# Patient Record
Sex: FEMALE | Race: WHITE | NOT HISPANIC OR LATINO | URBAN - METROPOLITAN AREA
[De-identification: names, ages, dates, MRNs, and addresses within clinical notes are randomized per-mention and may not be internally consistent; named-entity substitution may affect disease eponyms.]

---

## 2023-10-16 ENCOUNTER — INPATIENT (INPATIENT)
Facility: HOSPITAL | Age: 78
LOS: 2 days | Discharge: HOME | DRG: 919 | End: 2023-10-19
Attending: INTERNAL MEDICINE | Admitting: INTERNAL MEDICINE
Payer: MEDICARE

## 2023-10-16 VITALS
DIASTOLIC BLOOD PRESSURE: 68 MMHG | HEART RATE: 135 BPM | RESPIRATION RATE: 20 BRPM | SYSTOLIC BLOOD PRESSURE: 127 MMHG | OXYGEN SATURATION: 99 % | TEMPERATURE: 96 F

## 2023-10-16 DIAGNOSIS — K92.0 HEMATEMESIS: ICD-10-CM

## 2023-10-16 DIAGNOSIS — F32.A DEPRESSION, UNSPECIFIED: ICD-10-CM

## 2023-10-16 DIAGNOSIS — K91.840 POSTPROCEDURAL HEMORRHAGE OF A DIGESTIVE SYSTEM ORGAN OR STRUCTURE FOLLOWING A DIGESTIVE SYSTEM PROCEDURE: ICD-10-CM

## 2023-10-16 DIAGNOSIS — Z63.4 DISAPPEARANCE AND DEATH OF FAMILY MEMBER: ICD-10-CM

## 2023-10-16 DIAGNOSIS — K29.01 ACUTE GASTRITIS WITH BLEEDING: ICD-10-CM

## 2023-10-16 DIAGNOSIS — F03.A3 UNSPECIFIED DEMENTIA, MILD, WITH MOOD DISTURBANCE: ICD-10-CM

## 2023-10-16 DIAGNOSIS — D62 ACUTE POSTHEMORRHAGIC ANEMIA: ICD-10-CM
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
## 2023-10-16 LAB
ALBUMIN SERPL ELPH-MCNC: 3.4 G/DL — LOW (ref 3.5–5.2)
ALBUMIN SERPL ELPH-MCNC: 3.4 G/DL — LOW (ref 3.5–5.2)
ALBUMIN SERPL ELPH-MCNC: 4.1 G/DL — SIGNIFICANT CHANGE UP (ref 3.5–5.2)
ALP SERPL-CCNC: 48 U/L — SIGNIFICANT CHANGE UP (ref 30–115)
ALP SERPL-CCNC: 48 U/L — SIGNIFICANT CHANGE UP (ref 30–115)
ALP SERPL-CCNC: 57 U/L — SIGNIFICANT CHANGE UP (ref 30–115)
ALT FLD-CCNC: 6 U/L — SIGNIFICANT CHANGE UP (ref 0–41)
ALT FLD-CCNC: 6 U/L — SIGNIFICANT CHANGE UP (ref 0–41)
ALT FLD-CCNC: 8 U/L — SIGNIFICANT CHANGE UP (ref 0–41)
ANION GAP SERPL CALC-SCNC: 11 MMOL/L — SIGNIFICANT CHANGE UP (ref 7–14)
APTT BLD: 25.3 SEC — LOW (ref 27–39.2)
APTT BLD: 25.3 SEC — LOW (ref 27–39.2)
AST SERPL-CCNC: 16 U/L — SIGNIFICANT CHANGE UP (ref 0–41)
AST SERPL-CCNC: 16 U/L — SIGNIFICANT CHANGE UP (ref 0–41)
AST SERPL-CCNC: 20 U/L — SIGNIFICANT CHANGE UP (ref 0–41)
BASOPHILS # BLD AUTO: 0.06 K/UL — SIGNIFICANT CHANGE UP (ref 0–0.2)
BASOPHILS NFR BLD AUTO: 0.4 % — SIGNIFICANT CHANGE UP (ref 0–1)
BILIRUB SERPL-MCNC: 0.4 MG/DL — SIGNIFICANT CHANGE UP (ref 0.2–1.2)
BLD GP AB SCN SERPL QL: SIGNIFICANT CHANGE UP
BLD GP AB SCN SERPL QL: SIGNIFICANT CHANGE UP
BUN SERPL-MCNC: 33 MG/DL — HIGH (ref 10–20)
BUN SERPL-MCNC: 33 MG/DL — HIGH (ref 10–20)
BUN SERPL-MCNC: 36 MG/DL — HIGH (ref 10–20)
CALCIUM SERPL-MCNC: 8.7 MG/DL — SIGNIFICANT CHANGE UP (ref 8.4–10.5)
CALCIUM SERPL-MCNC: 8.7 MG/DL — SIGNIFICANT CHANGE UP (ref 8.4–10.5)
CALCIUM SERPL-MCNC: 9.4 MG/DL — SIGNIFICANT CHANGE UP (ref 8.4–10.5)
CHLORIDE SERPL-SCNC: 105 MMOL/L — SIGNIFICANT CHANGE UP (ref 98–110)
CO2 SERPL-SCNC: 26 MMOL/L — SIGNIFICANT CHANGE UP (ref 17–32)
CO2 SERPL-SCNC: 26 MMOL/L — SIGNIFICANT CHANGE UP (ref 17–32)
CO2 SERPL-SCNC: 28 MMOL/L — SIGNIFICANT CHANGE UP (ref 17–32)
CREAT SERPL-MCNC: 0.8 MG/DL — SIGNIFICANT CHANGE UP (ref 0.7–1.5)
CREAT SERPL-MCNC: 0.8 MG/DL — SIGNIFICANT CHANGE UP (ref 0.7–1.5)
CREAT SERPL-MCNC: 0.9 MG/DL — SIGNIFICANT CHANGE UP (ref 0.7–1.5)
EGFR: 66 ML/MIN/1.73M2 — SIGNIFICANT CHANGE UP
EGFR: 76 ML/MIN/1.73M2 — SIGNIFICANT CHANGE UP
EGFR: 76 ML/MIN/1.73M2 — SIGNIFICANT CHANGE UP
EOSINOPHIL # BLD AUTO: 0.01 K/UL — SIGNIFICANT CHANGE UP (ref 0–0.7)
EOSINOPHIL NFR BLD AUTO: 0.1 % — SIGNIFICANT CHANGE UP (ref 0–8)
GLUCOSE SERPL-MCNC: 85 MG/DL — SIGNIFICANT CHANGE UP (ref 70–99)
GLUCOSE SERPL-MCNC: 85 MG/DL — SIGNIFICANT CHANGE UP (ref 70–99)
GLUCOSE SERPL-MCNC: 97 MG/DL — SIGNIFICANT CHANGE UP (ref 70–99)
HCT VFR BLD CALC: 25 % — LOW (ref 37–47)
HCT VFR BLD CALC: 25 % — LOW (ref 37–47)
HCT VFR BLD CALC: 29 % — LOW (ref 37–47)
HGB BLD-MCNC: 8.1 G/DL — LOW (ref 12–16)
HGB BLD-MCNC: 8.1 G/DL — LOW (ref 12–16)
HGB BLD-MCNC: 9.3 G/DL — LOW (ref 12–16)
IMM GRANULOCYTES NFR BLD AUTO: 0.4 % — HIGH (ref 0.1–0.3)
INR BLD: 1.05 RATIO — SIGNIFICANT CHANGE UP (ref 0.65–1.3)
LACTATE SERPL-SCNC: 2.5 MMOL/L — HIGH (ref 0.7–2)
LYMPHOCYTES # BLD AUTO: 1.91 K/UL — SIGNIFICANT CHANGE UP (ref 1.2–3.4)
LYMPHOCYTES # BLD AUTO: 11.8 % — LOW (ref 20.5–51.1)
MCHC RBC-ENTMCNC: 27 PG — SIGNIFICANT CHANGE UP (ref 27–31)
MCHC RBC-ENTMCNC: 27.2 PG — SIGNIFICANT CHANGE UP (ref 27–31)
MCHC RBC-ENTMCNC: 27.2 PG — SIGNIFICANT CHANGE UP (ref 27–31)
MCHC RBC-ENTMCNC: 32.1 G/DL — SIGNIFICANT CHANGE UP (ref 32–37)
MCHC RBC-ENTMCNC: 32.4 G/DL — SIGNIFICANT CHANGE UP (ref 32–37)
MCHC RBC-ENTMCNC: 32.4 G/DL — SIGNIFICANT CHANGE UP (ref 32–37)
MCV RBC AUTO: 83.9 FL — SIGNIFICANT CHANGE UP (ref 81–99)
MCV RBC AUTO: 83.9 FL — SIGNIFICANT CHANGE UP (ref 81–99)
MCV RBC AUTO: 84.3 FL — SIGNIFICANT CHANGE UP (ref 81–99)
MONOCYTES # BLD AUTO: 0.52 K/UL — SIGNIFICANT CHANGE UP (ref 0.1–0.6)
MONOCYTES NFR BLD AUTO: 3.2 % — SIGNIFICANT CHANGE UP (ref 1.7–9.3)
NEUTROPHILS # BLD AUTO: 13.57 K/UL — HIGH (ref 1.4–6.5)
NEUTROPHILS NFR BLD AUTO: 84.1 % — HIGH (ref 42.2–75.2)
NRBC # BLD: 0 /100 WBCS — SIGNIFICANT CHANGE UP (ref 0–0)
PLATELET # BLD AUTO: 254 K/UL — SIGNIFICANT CHANGE UP (ref 130–400)
PLATELET # BLD AUTO: 254 K/UL — SIGNIFICANT CHANGE UP (ref 130–400)
PLATELET # BLD AUTO: 299 K/UL — SIGNIFICANT CHANGE UP (ref 130–400)
PMV BLD: 11.6 FL — HIGH (ref 7.4–10.4)
PMV BLD: 11.7 FL — HIGH (ref 7.4–10.4)
PMV BLD: 11.7 FL — HIGH (ref 7.4–10.4)
POTASSIUM SERPL-MCNC: 4 MMOL/L — SIGNIFICANT CHANGE UP (ref 3.5–5)
POTASSIUM SERPL-MCNC: 4 MMOL/L — SIGNIFICANT CHANGE UP (ref 3.5–5)
POTASSIUM SERPL-MCNC: 4.7 MMOL/L — SIGNIFICANT CHANGE UP (ref 3.5–5)
POTASSIUM SERPL-SCNC: 4 MMOL/L — SIGNIFICANT CHANGE UP (ref 3.5–5)
POTASSIUM SERPL-SCNC: 4 MMOL/L — SIGNIFICANT CHANGE UP (ref 3.5–5)
POTASSIUM SERPL-SCNC: 4.7 MMOL/L — SIGNIFICANT CHANGE UP (ref 3.5–5)
PROT SERPL-MCNC: 5.4 G/DL — LOW (ref 6–8)
PROT SERPL-MCNC: 5.4 G/DL — LOW (ref 6–8)
PROT SERPL-MCNC: 6.6 G/DL — SIGNIFICANT CHANGE UP (ref 6–8)
PROTHROM AB SERPL-ACNC: 12 SEC — SIGNIFICANT CHANGE UP (ref 9.95–12.87)
RBC # BLD: 2.98 M/UL — LOW (ref 4.2–5.4)
RBC # BLD: 2.98 M/UL — LOW (ref 4.2–5.4)
RBC # BLD: 3.44 M/UL — LOW (ref 4.2–5.4)
RBC # FLD: 13.8 % — SIGNIFICANT CHANGE UP (ref 11.5–14.5)
RBC # FLD: 13.9 % — SIGNIFICANT CHANGE UP (ref 11.5–14.5)
RBC # FLD: 13.9 % — SIGNIFICANT CHANGE UP (ref 11.5–14.5)
SODIUM SERPL-SCNC: 142 MMOL/L — SIGNIFICANT CHANGE UP (ref 135–146)
SODIUM SERPL-SCNC: 142 MMOL/L — SIGNIFICANT CHANGE UP (ref 135–146)
SODIUM SERPL-SCNC: 144 MMOL/L — SIGNIFICANT CHANGE UP (ref 135–146)
WBC # BLD: 12.48 K/UL — HIGH (ref 4.8–10.8)
WBC # BLD: 12.48 K/UL — HIGH (ref 4.8–10.8)
WBC # BLD: 16.14 K/UL — HIGH (ref 4.8–10.8)
WBC # FLD AUTO: 12.48 K/UL — HIGH (ref 4.8–10.8)
WBC # FLD AUTO: 12.48 K/UL — HIGH (ref 4.8–10.8)
WBC # FLD AUTO: 16.14 K/UL — HIGH (ref 4.8–10.8)

## 2023-10-16 PROCEDURE — 88305 TISSUE EXAM BY PATHOLOGIST: CPT

## 2023-10-16 PROCEDURE — 36415 COLL VENOUS BLD VENIPUNCTURE: CPT

## 2023-10-16 PROCEDURE — 86803 HEPATITIS C AB TEST: CPT

## 2023-10-16 PROCEDURE — 74177 CT ABD & PELVIS W/CONTRAST: CPT | Mod: 26,MA

## 2023-10-16 PROCEDURE — 80053 COMPREHEN METABOLIC PANEL: CPT

## 2023-10-16 PROCEDURE — 85027 COMPLETE CBC AUTOMATED: CPT

## 2023-10-16 PROCEDURE — 99285 EMERGENCY DEPT VISIT HI MDM: CPT | Mod: GC

## 2023-10-16 PROCEDURE — 99221 1ST HOSP IP/OBS SF/LOW 40: CPT

## 2023-10-16 PROCEDURE — 71045 X-RAY EXAM CHEST 1 VIEW: CPT | Mod: 26

## 2023-10-16 PROCEDURE — 80048 BASIC METABOLIC PNL TOTAL CA: CPT

## 2023-10-16 PROCEDURE — 88312 SPECIAL STAINS GROUP 1: CPT

## 2023-10-16 RX ORDER — LANOLIN ALCOHOL/MO/W.PET/CERES
3 CREAM (GRAM) TOPICAL AT BEDTIME
Refills: 0 | Status: DISCONTINUED | OUTPATIENT
Start: 2023-10-16 | End: 2023-10-18

## 2023-10-16 RX ORDER — ONDANSETRON 8 MG/1
4 TABLET, FILM COATED ORAL EVERY 8 HOURS
Refills: 0 | Status: DISCONTINUED | OUTPATIENT
Start: 2023-10-16 | End: 2023-10-18

## 2023-10-16 RX ORDER — PANTOPRAZOLE SODIUM 20 MG/1
8 TABLET, DELAYED RELEASE ORAL
Qty: 80 | Refills: 0 | Status: DISCONTINUED | OUTPATIENT
Start: 2023-10-16 | End: 2023-10-17

## 2023-10-16 RX ORDER — PANTOPRAZOLE SODIUM 20 MG/1
80 TABLET, DELAYED RELEASE ORAL ONCE
Refills: 0 | Status: COMPLETED | OUTPATIENT
Start: 2023-10-16 | End: 2023-10-16

## 2023-10-16 RX ORDER — CEFTRIAXONE 500 MG/1
1000 INJECTION, POWDER, FOR SOLUTION INTRAMUSCULAR; INTRAVENOUS ONCE
Refills: 0 | Status: COMPLETED | OUTPATIENT
Start: 2023-10-16 | End: 2023-10-16

## 2023-10-16 RX ORDER — CEFTRIAXONE 500 MG/1
1000 INJECTION, POWDER, FOR SOLUTION INTRAMUSCULAR; INTRAVENOUS EVERY 24 HOURS
Refills: 0 | Status: DISCONTINUED | OUTPATIENT
Start: 2023-10-16 | End: 2023-10-17

## 2023-10-16 RX ORDER — SODIUM CHLORIDE 9 MG/ML
1000 INJECTION, SOLUTION INTRAVENOUS ONCE
Refills: 0 | Status: COMPLETED | OUTPATIENT
Start: 2023-10-16 | End: 2023-10-16

## 2023-10-16 RX ORDER — ACETAMINOPHEN 500 MG
650 TABLET ORAL EVERY 6 HOURS
Refills: 0 | Status: DISCONTINUED | OUTPATIENT
Start: 2023-10-16 | End: 2023-10-18

## 2023-10-16 RX ORDER — CHLORHEXIDINE GLUCONATE 213 G/1000ML
1 SOLUTION TOPICAL
Refills: 0 | Status: DISCONTINUED | OUTPATIENT
Start: 2023-10-16 | End: 2023-10-18

## 2023-10-16 RX ADMIN — Medication 100 MILLIGRAM(S): at 20:15

## 2023-10-16 RX ADMIN — CEFTRIAXONE 100 MILLIGRAM(S): 500 INJECTION, POWDER, FOR SOLUTION INTRAMUSCULAR; INTRAVENOUS at 19:27

## 2023-10-16 RX ADMIN — PANTOPRAZOLE SODIUM 80 MILLIGRAM(S): 20 TABLET, DELAYED RELEASE ORAL at 15:30

## 2023-10-16 RX ADMIN — SODIUM CHLORIDE 1000 MILLILITER(S): 9 INJECTION, SOLUTION INTRAVENOUS at 16:11

## 2023-10-16 RX ADMIN — PANTOPRAZOLE SODIUM 10 MG/HR: 20 TABLET, DELAYED RELEASE ORAL at 15:30

## 2023-10-16 SDOH — SOCIAL STABILITY - SOCIAL INSECURITY: DISSAPEARANCE AND DEATH OF FAMILY MEMBER: Z63.4

## 2023-10-16 NOTE — H&P ADULT - HISTORY OF PRESENT ILLNESS
78-year-old female accompanied by her best friend at bed side c/o vomiting blood. Pt  with past medical history of diabetes, mild dementia.  Presenting with hematemesis.  Patient poor historian, states that she had tooth bridge implanted 10 days ago with bleeding afterwards which stopped 4 days ago.  Pt denies hematemesis.  Patient's best friend at bedside, endorsed witnessing hematemesis yesterday, multiple episodes.  Denies abdominal pain.  Denies fever, chills.  Denies lightheadedness NO  bright red blood per rectum or melena.  No history of alcohol use.   78-year-old female accompanied by her best friend at bed side c/o vomiting blood. Pt states that her friend didn't understand that she went to the dentist and she had an oral blister that bled and it's not GI bleed.  Pt  with past medical history of diabetes, mild dementia.  Presenting with hematemesis.  Patient poor historian, states that she had tooth bridge implanted 10 days ago with bleeding afterwards which stopped 4 days ago.  Pt denies hematemesis.  Patient's best friend at bedside, endorsed witnessing hematemesis yesterday, multiple episodes.  Denies abdominal pain.  Denies fever, chills.  Denies lightheadedness NO  bright red blood per rectum or melena.  No history of alcohol use.

## 2023-10-16 NOTE — ED PROVIDER NOTE - OBJECTIVE STATEMENT
Patient is a 78-year-old female with past medical history of diabetes, dementia?  Presenting with hematemesis.  Patient poor historian, states that she had tooth bridge implanted 10 days ago with bleeding afterwards which stopped 4 days ago.  Denies hematemesis.  Patient's best friend at bedside, endorsed witnessing hematemesis yesterday, multiple episodes. Denies bright red blood per rectum or melena.  Denies history of alcohol use.  Denies abdominal pain.  Denies fever, chills.  Denies lightheadedness.  Patient otherwise well

## 2023-10-16 NOTE — ED PROVIDER NOTE - CLINICAL SUMMARY MEDICAL DECISION MAKING FREE TEXT BOX
78-year-old female no past medical history states presenting here complaining of bleeding from mouth that resolved 4 days ago.  Patient states she had a bridge placed to her mouth 2 weeks ago and then blood from there endorsing she has not had vomiting blood although her sister had recommended she come to the ED patient denies fevers chills cough congestion nausea vomiting abdominal pain black bloody stools states does not take any medications because she is healthy has not needed to see a primary care doctor.  Later spoke with sister and friend who did endorse that she did have bloody emesis vs reviewed labs imaging ekg obtained and reviewed cxr demonstrated retrocardiac opacity, blood cultures and antibiotics given. GI consulted for hematemesis, ICU consulted recommended floor. s/o to hospitalist dr. shea

## 2023-10-16 NOTE — H&P ADULT - NSHPPHYSICALEXAM_GEN_ALL_CORE
Addended by: Taisha Cyr on: 5/7/2019 01:50 PM     Modules accepted: Orders GENERAL:  77y/o Female NAD, resting comfortably.  HEAD:  Atraumatic, Normocephalic  EYES: EOMI, PERRLA, conjunctiva and sclera clear  NECK: Supple, No JVD, no cervical lymphadenopathy, non-tender  CHEST/LUNG: Clear to auscultation bilaterally; No wheeze, rhonchi, or rales  HEART: Regular rate and rhythm; S1&S2  ABDOMEN: Soft, Nontender, Nondistended x 4 quadrants; Bowel sounds present  EXTREMITIES:   Peripheral Pulses Present, No clubbing, no cyanosis, or no edema, no calf tenderness  PSYCH: AAOx3, cooperative, appropriate  NEUROLOGY: WNL  SKIN: WNL

## 2023-10-16 NOTE — ED PROVIDER NOTE - ATTENDING CONTRIBUTION TO CARE
78-year-old female no past medical history states presenting here complaining of bleeding from mouth that resolved 4 days ago.  Patient states she had a bridge placed to her mouth 2 weeks ago and then blood from there endorsing she has not had vomiting blood although her sister had recommended she come to the ED patient denies fevers chills cough congestion nausea vomiting abdominal pain black bloody stools states does not take any medications because she is healthy has not needed to see a primary care doctor.  Later spoke with sister and friend who did endorse that she did have bloody emesis  CONSTITUTIONAL: WA / WN / NAD  HEAD: NCAT  EYES: PERRL; EOMI; pale conjunctiva   ENT: Normal pharynx; mucous membranes pink/moist, no erythema.  NECK: Supple; no meningeal signs  CARD: tachycardic nl S1/S2; no M/R/G. Pulses equal bilaterally.  RESP: Respiratory rate and effort are normal; breath sounds clear and equal bilaterally.  ABD: Soft, NT ND   MSK/EXT: No gross deformities; full range of motion.  SKIN: Warm and dry;   NEURO: AAOx3,  PSYCH: Memory Intact, Normal Affect

## 2023-10-16 NOTE — H&P ADULT - ASSESSMENT
78-year-old female accompanied by her best friend at bed side c/o vomiting blood. Pt states that her friend didn't understand that she went to the dentist and she had an oral blister that bled and it's not GI bleed.  Pt  with past medical history of diabetes, mild dementia.  Presenting with hematemesis.  Patient poor historian, states that she had tooth bridge implanted 10 days ago with bleeding afterwards which stopped 4 days ago.  Pt denies hematemesis.  Patient's best friend at bedside, endorsed witnessing hematemesis yesterday, multiple episodes.  Denies abdominal pain.  Denies fever, chills.  Denies lightheadedness NO  bright red blood per rectum or melena.  No history of alcohol use.      DX: GI bleed:  GI consult  10 pm CBC   am labs CBC , BMP    pt denies using medication     except Aleeve prn pain     prophylaxis GI/VTE    Case D/W Karan   78-year-old female accompanied by her best friend at bed side c/o vomiting blood. Pt states that her friend didn't understand that she went to the dentist and she had an oral blister that bled and it's not GI bleed.  Pt  with past medical history of diabetes, mild dementia.  Presenting with hematemesis.  Patient poor historian, states that she had tooth bridge implanted 10 days ago with bleeding afterwards which stopped 4 days ago.  Pt denies hematemesis.  Patient's best friend at bedside, endorsed witnessing hematemesis yesterday, multiple episodes.  Denies abdominal pain.  Denies fever, chills.  Denies lightheadedness NO  bright red blood per rectum or melena.  No history of alcohol use.      DX: GI bleed:  GI consult  10 pm CBC   am labs CBC , BMP    pneumonia/PNA:  IV doxyxyline  IV rocephine    pt denies using medication     except Aleeve prn pain     prophylaxis GI/VTE    Case D/W Karan

## 2023-10-16 NOTE — ED PROVIDER NOTE - PROGRESS NOTE DETAILS
spoke with sister & friend who stated that yesterday they did see vomiting blood with multiple "puddles and stained sheets no jimbo clots" GI consulted for suspected upper GI bleed -CD spoke to ICU, Dr. Brennan regarding ICU eval, denying patient at this time, states PT appropriate for floor -CD SR: spoke with Dr. Shah , no acute intra-abdominal

## 2023-10-16 NOTE — H&P ADULT - NS ATTEND AMEND GEN_ALL_CORE FT
Seen at bedside, agree with above (Strongly doubt gram negative pneumonia and sepsis not present on admission (to me))

## 2023-10-16 NOTE — H&P ADULT - NS ATTEND OPT1 GEN_ALL_CORE
PAST SURGICAL HISTORY:  No significant past surgical history I attest my time as attending is greater than 50% of the total combined time spent on qualifying patient care activities by the PA/NP and attending.

## 2023-10-16 NOTE — ED PROVIDER NOTE - CARE PLAN
1 Principal Discharge DX:	Hematemesis   Principal Discharge DX:	Hematemesis  Secondary Diagnosis:	PNA (pneumonia)

## 2023-10-16 NOTE — H&P ADULT - NSHPLABSRESULTS_GEN_ALL_CORE
8.1    12.48 )-----------( 254      ( 16 Oct 2023 21:25 )             25.0       10-16    144  |  105  |  36<H>  ----------------------------<  97  4.7   |  28  |  0.9    Ca    9.4      16 Oct 2023 15:45    TPro  6.6  /  Alb  4.1  /  TBili  0.4  /  DBili  x   /  AST  20  /  ALT  8   /  AlkPhos  57  10-16              Urinalysis Basic - ( 16 Oct 2023 15:45 )    Color: x / Appearance: x / SG: x / pH: x  Gluc: 97 mg/dL / Ketone: x  / Bili: x / Urobili: x   Blood: x / Protein: x / Nitrite: x   Leuk Esterase: x / RBC: x / WBC x   Sq Epi: x / Non Sq Epi: x / Bacteria: x        PT/INR - ( 16 Oct 2023 15:45 )   PT: 12.00 sec;   INR: 1.05 ratio         PTT - ( 16 Oct 2023 15:45 )  PTT:25.3 sec    Lactate Trend  10-16 @ 15:45 Lactate:2.5             CAPILLARY BLOOD GLUCOSE

## 2023-10-17 LAB
ALBUMIN SERPL ELPH-MCNC: 3.5 G/DL — SIGNIFICANT CHANGE UP (ref 3.5–5.2)
ALBUMIN SERPL ELPH-MCNC: 3.5 G/DL — SIGNIFICANT CHANGE UP (ref 3.5–5.2)
ALP SERPL-CCNC: 52 U/L — SIGNIFICANT CHANGE UP (ref 30–115)
ALP SERPL-CCNC: 52 U/L — SIGNIFICANT CHANGE UP (ref 30–115)
ALT FLD-CCNC: 6 U/L — SIGNIFICANT CHANGE UP (ref 0–41)
ALT FLD-CCNC: 6 U/L — SIGNIFICANT CHANGE UP (ref 0–41)
ANION GAP SERPL CALC-SCNC: 9 MMOL/L — SIGNIFICANT CHANGE UP (ref 7–14)
ANION GAP SERPL CALC-SCNC: 9 MMOL/L — SIGNIFICANT CHANGE UP (ref 7–14)
AST SERPL-CCNC: 21 U/L — SIGNIFICANT CHANGE UP (ref 0–41)
AST SERPL-CCNC: 21 U/L — SIGNIFICANT CHANGE UP (ref 0–41)
BILIRUB SERPL-MCNC: 0.5 MG/DL — SIGNIFICANT CHANGE UP (ref 0.2–1.2)
BILIRUB SERPL-MCNC: 0.5 MG/DL — SIGNIFICANT CHANGE UP (ref 0.2–1.2)
BUN SERPL-MCNC: 27 MG/DL — HIGH (ref 10–20)
BUN SERPL-MCNC: 27 MG/DL — HIGH (ref 10–20)
CALCIUM SERPL-MCNC: 9 MG/DL — SIGNIFICANT CHANGE UP (ref 8.4–10.5)
CALCIUM SERPL-MCNC: 9 MG/DL — SIGNIFICANT CHANGE UP (ref 8.4–10.5)
CHLORIDE SERPL-SCNC: 106 MMOL/L — SIGNIFICANT CHANGE UP (ref 98–110)
CHLORIDE SERPL-SCNC: 106 MMOL/L — SIGNIFICANT CHANGE UP (ref 98–110)
CO2 SERPL-SCNC: 28 MMOL/L — SIGNIFICANT CHANGE UP (ref 17–32)
CO2 SERPL-SCNC: 28 MMOL/L — SIGNIFICANT CHANGE UP (ref 17–32)
CREAT SERPL-MCNC: 1 MG/DL — SIGNIFICANT CHANGE UP (ref 0.7–1.5)
CREAT SERPL-MCNC: 1 MG/DL — SIGNIFICANT CHANGE UP (ref 0.7–1.5)
EGFR: 58 ML/MIN/1.73M2 — LOW
EGFR: 58 ML/MIN/1.73M2 — LOW
GLUCOSE SERPL-MCNC: 96 MG/DL — SIGNIFICANT CHANGE UP (ref 70–99)
GLUCOSE SERPL-MCNC: 96 MG/DL — SIGNIFICANT CHANGE UP (ref 70–99)
HCT VFR BLD CALC: 26.2 % — LOW (ref 37–47)
HCT VFR BLD CALC: 26.2 % — LOW (ref 37–47)
HCV AB S/CO SERPL IA: 0.04 COI — SIGNIFICANT CHANGE UP
HCV AB S/CO SERPL IA: 0.04 COI — SIGNIFICANT CHANGE UP
HCV AB SERPL-IMP: SIGNIFICANT CHANGE UP
HCV AB SERPL-IMP: SIGNIFICANT CHANGE UP
HGB BLD-MCNC: 8.4 G/DL — LOW (ref 12–16)
HGB BLD-MCNC: 8.4 G/DL — LOW (ref 12–16)
MCHC RBC-ENTMCNC: 28.1 PG — SIGNIFICANT CHANGE UP (ref 27–31)
MCHC RBC-ENTMCNC: 28.1 PG — SIGNIFICANT CHANGE UP (ref 27–31)
MCHC RBC-ENTMCNC: 32.1 G/DL — SIGNIFICANT CHANGE UP (ref 32–37)
MCHC RBC-ENTMCNC: 32.1 G/DL — SIGNIFICANT CHANGE UP (ref 32–37)
MCV RBC AUTO: 87.6 FL — SIGNIFICANT CHANGE UP (ref 81–99)
MCV RBC AUTO: 87.6 FL — SIGNIFICANT CHANGE UP (ref 81–99)
NRBC # BLD: 0 /100 WBCS — SIGNIFICANT CHANGE UP (ref 0–0)
NRBC # BLD: 0 /100 WBCS — SIGNIFICANT CHANGE UP (ref 0–0)
PLATELET # BLD AUTO: 271 K/UL — SIGNIFICANT CHANGE UP (ref 130–400)
PLATELET # BLD AUTO: 271 K/UL — SIGNIFICANT CHANGE UP (ref 130–400)
PMV BLD: 11.2 FL — HIGH (ref 7.4–10.4)
PMV BLD: 11.2 FL — HIGH (ref 7.4–10.4)
POTASSIUM SERPL-MCNC: 4.1 MMOL/L — SIGNIFICANT CHANGE UP (ref 3.5–5)
POTASSIUM SERPL-MCNC: 4.1 MMOL/L — SIGNIFICANT CHANGE UP (ref 3.5–5)
POTASSIUM SERPL-SCNC: 4.1 MMOL/L — SIGNIFICANT CHANGE UP (ref 3.5–5)
POTASSIUM SERPL-SCNC: 4.1 MMOL/L — SIGNIFICANT CHANGE UP (ref 3.5–5)
PROT SERPL-MCNC: 5.7 G/DL — LOW (ref 6–8)
PROT SERPL-MCNC: 5.7 G/DL — LOW (ref 6–8)
RBC # BLD: 2.99 M/UL — LOW (ref 4.2–5.4)
RBC # BLD: 2.99 M/UL — LOW (ref 4.2–5.4)
RBC # FLD: 14 % — SIGNIFICANT CHANGE UP (ref 11.5–14.5)
RBC # FLD: 14 % — SIGNIFICANT CHANGE UP (ref 11.5–14.5)
SODIUM SERPL-SCNC: 143 MMOL/L — SIGNIFICANT CHANGE UP (ref 135–146)
SODIUM SERPL-SCNC: 143 MMOL/L — SIGNIFICANT CHANGE UP (ref 135–146)
WBC # BLD: 10.76 K/UL — SIGNIFICANT CHANGE UP (ref 4.8–10.8)
WBC # BLD: 10.76 K/UL — SIGNIFICANT CHANGE UP (ref 4.8–10.8)
WBC # FLD AUTO: 10.76 K/UL — SIGNIFICANT CHANGE UP (ref 4.8–10.8)
WBC # FLD AUTO: 10.76 K/UL — SIGNIFICANT CHANGE UP (ref 4.8–10.8)

## 2023-10-17 PROCEDURE — 99233 SBSQ HOSP IP/OBS HIGH 50: CPT

## 2023-10-17 PROCEDURE — 99222 1ST HOSP IP/OBS MODERATE 55: CPT

## 2023-10-17 RX ORDER — PANTOPRAZOLE SODIUM 20 MG/1
40 TABLET, DELAYED RELEASE ORAL
Refills: 0 | Status: DISCONTINUED | OUTPATIENT
Start: 2023-10-17 | End: 2023-10-18

## 2023-10-17 RX ADMIN — Medication 100 MILLIGRAM(S): at 06:18

## 2023-10-17 RX ADMIN — PANTOPRAZOLE SODIUM 10 MG/HR: 20 TABLET, DELAYED RELEASE ORAL at 06:16

## 2023-10-17 NOTE — CONSULT NOTE ADULT - ASSESSMENT
78-year-old female with past medical history of diabetes, mild dementia. presents for oral bleeding. GI consulted for anemia and oral bleeding.    # Oral bleeding s/p dental procedure. resolved   # Anemia (Hemoglobin of 9.5. No baseline available)   No signs of overt GI bleeding   No history of alcohol use.   hx of NSAIDs daily since dental procedure.,  No previous EGD/colonoscopy   No Fhx of Gi cancers.   LORENA brown stool     PLAN   Check Iron profile   Monitor for signs of bleeding   Avoid NSAIDs  PPI QD   Will need EGD and Colonoscopy as outpatient or as inpatient if patient is showing signs of GI bleeding.   - Follow up with our [facility] located at [address] [place] [address]. Phone Number: [phone]   78-year-old female with past medical history of diabetes, mild dementia. presents for oral bleeding. GI consulted for anemia and oral bleeding.    # Oral bleeding s/p dental procedure. resolved   # Anemia (Hemoglobin of 9.5. No baseline available) r/o PUD in setting of NSAIDs use.  No signs of overt GI bleeding   No history of alcohol use.   hx of NSAIDs daily since dental procedure.,  No previous EGD/colonoscopy   No Fhx of Gi cancers.   LORENA brown stool     PLAN   Check Iron profile   Monitor for signs of bleeding   Avoid NSAIDs  PPI QD   NPO after midnight   EGD tomorrow   - Follow up with our [facility] located at [address] [place] [address]. Phone Number: [phone]

## 2023-10-17 NOTE — PROGRESS NOTE ADULT - ASSESSMENT
#acute blood loss anemia - likely due to recent dental work - however - unclear story if hematemesis or not (spoke with pts sister as well)  - daily NSAID use, coffee use, no anticoag or antiplt  - spoke with GI - plan to do EGD tomorrow - CLD and npo after mn  - hgb mild drop - monitor cbc - only transfuse <7    #depression - recent death of , has not had proper medical follow up - will need outpt follow    discussed with Dr grewal - PMD- will take over case on 10/18 #acute blood loss anemia - likely due to recent dental work - however - unclear story if hematemesis or not (spoke with pts sister as well)  - daily NSAID use, coffee use, no anticoag or antiplt  - spoke with GI - plan to do EGD tomorrow - CLD and npo after mn  - hgb mild drop - monitor cbc - only transfuse <7    #no clinical signs of PNA - dc antibiotics     #depression - recent death of , has not had proper medical follow up - will need outpt follow    discussed with Dr grewal - PMD- will take over case on 10/18

## 2023-10-17 NOTE — PATIENT PROFILE ADULT - FUNCTIONAL ASSESSMENT - BASIC MOBILITY 6.
4 = No assist / stand by assistance  3-calculated by average/Not able to assess (calculate score using [facility] averaging method)

## 2023-10-17 NOTE — CONSULT NOTE ADULT - SUBJECTIVE AND OBJECTIVE BOX
Gastroenterology Consultation:    Patient is a 78y old  Female who presents with a chief complaint of oral bleeding (16 Oct 2023 20:33)        Admitted on: 10-16-23      HPI:  78-year-old female with past medical history of diabetes, mild dementia. presents for oral bleeding. Pt states that she went to the dentist and she had an oral blister that bled and it's not GI bleed. states that she had tooth bridge implanted 10 days ago with bleeding afterwards which stopped 4 days ago.  Pt denies hematemesis.  Denies abdominal pain.  Denies fever, chills.  Denies lightheadedness No  bright red blood per rectum or melena.  No history of alcohol use. hx of NSAIDs daily since dental procedure., No previous EGD/colonoscopy no Fhx of Gi cancers. GI consulted for anemia and oral bleeding.       Prior EGD/ Colonoscopy:  no previous endoscopy     PAST MEDICAL & SURGICAL HISTORY:  No pertinent past medical history      No significant past surgical history            FAMILY HISTORY:  No pertinent family history in first degree relatives  No FHx of GI cancers       Social History:  Tobacco: denies   Alcohol: denies   Drugs: denies     Home Medications:        MEDICATIONS  (STANDING):  cefTRIAXone   IVPB 1000 milliGRAM(s) IV Intermittent every 24 hours  chlorhexidine 2% Cloths 1 Application(s) Topical <User Schedule>  doxycycline IVPB 100 milliGRAM(s) IV Intermittent every 12 hours  pantoprazole Infusion 8 mG/Hr (10 mL/Hr) IV Continuous <Continuous>    MEDICATIONS  (PRN):  acetaminophen     Tablet .. 650 milliGRAM(s) Oral every 6 hours PRN Temp greater or equal to 38C (100.4F), Mild Pain (1 - 3)  aluminum hydroxide/magnesium hydroxide/simethicone Suspension 30 milliLiter(s) Oral every 4 hours PRN Dyspepsia  melatonin 3 milliGRAM(s) Oral at bedtime PRN Insomnia  ondansetron Injectable 4 milliGRAM(s) IV Push every 8 hours PRN Nausea and/or Vomiting      Allergies  No Known Allergies      Review of Systems:   Constitutional:  No Fever, No Chills  ENT/Mouth:  No Hearing Changes,  No Difficulty Swallowing  Eyes:  No Eye Pain, No Vision Changes  Cardiovascular:  No Chest Pain, No Palpitations  Respiratory:  No Cough, No Dyspnea  Gastrointestinal:  As described in HPI  Musculoskeletal:  No Joint Swelling, No Back Pain  Skin:  No Skin Lesions, No Jaundice  Neuro:  No Syncope, No Dizziness  Heme/Lymph:  No Bruising, No Bleeding.          Physical Examination:  T(C): 35.8 (10-16-23 @ 14:02), Max: 35.8 (10-16-23 @ 14:02)  HR: 94 (10-16-23 @ 20:32) (91 - 135)  BP: 132/75 (10-16-23 @ 20:32) (127/68 - 150/72)  RR: 18 (10-16-23 @ 20:32) (18 - 20)  SpO2: 98% (10-16-23 @ 20:32) (98% - 100%)    GENERAL:  no acute distress.  HEAD:  Atraumatic, Normocephalic  EYES: conjunctiva and sclera clear  NECK: Supple, no JVD or thyromegaly  CHEST/LUNG: Clear to auscultation bilaterally  HEART: Regular rate and rhythm; normal S1, S2, No murmurs.  ABDOMEN: Soft, nontender, nondistended LORENA brown   NEUROLOGY: AAOx3,  SKIN: Intact, no jaundice    Data:                        8.1    12.48 )-----------( 254      ( 16 Oct 2023 21:25 )             25.0     Hgb Trend:  8.1  10-16-23 @ 21:25  9.3  10-16-23 @ 15:45      10-16    142  |  105  |  33<H>  ----------------------------<  85  4.0   |  26  |  0.8    Ca    8.7      16 Oct 2023 21:25    TPro  5.4<L>  /  Alb  3.4<L>  /  TBili  0.4  /  DBili  x   /  AST  16  /  ALT  6   /  AlkPhos  48  10-16    Liver panel trend:  TBili 0.4   /   AST 16   /   ALT 6   /   AlkP 48   /   Tptn 5.4   /   Alb 3.4    /   DBili --      10-16  TBili 0.4   /   AST 20   /   ALT 8   /   AlkP 57   /   Tptn 6.6   /   Alb 4.1    /   DBili --      10-16      PT/INR - ( 16 Oct 2023 15:45 )   PT: 12.00 sec;   INR: 1.05 ratio         PTT - ( 16 Oct 2023 15:45 )  PTT:25.3 sec        Radiology:  CT Abdomen and Pelvis w/ IV Cont:   ACC: [id number] EXAM:  CT ABDOMEN AND PELVIS IC   ORDERED BY: JOLANTA SHETH     PROCEDURE DATE:  10/16/2023          INTERPRETATION:  REASON FOR EXAM / CLINICAL STATEMENT:  Hematemesis. WBC   16.14  Hgb 9.3    PMHx of  diabetes, possible dementia      TECHNIQUE:  Contiguous axial CT images were obtained from the diaphragms   through the pubic symphysis with IV contrast. IV contrast was employed   (Omnipaque 350); 90 ml was administered and 10 ml was discarded.    Reformatted images in the coronal and sagittal planes were acquired.      COMPARISON CT: None    OTHER STUDIES USED FOR CORRELATION: None.      TUBES AND LINES: None.    LOWER CHEST: The lung bases are clear. No pleural or pericardial effusion.    HEPATIC: The liver is normal in size with no evidence of solid mass or   bile duct dilatation. The portal vein is patent. The hepatic veins are   opacified.    BILIARY: No calcified gallstones are noted.    SPLEEN: Unremarkable.    PANCREAS: The pancreas is normal in size and configuration. No evidence   of mass or pancreatitis.    ADRENAL GLANDS: Unremarkable.    KIDNEYS: There is symmetric renal enhancement. No evidence of   hydronephrosis or solid mass. Right renal cyst and bilateral   subcentimeter hypodense cortical lesionstoo small to characterize.    ABDOMINOPELVIC NODES: Unremarkable.    PELVIC ORGANS: Calcified uterine fibroid. No evidence of pelvic   lymphadenopathy or fluid collection.    BLADDER: Unremarkable.    PERITONEUM/MESENTERY/BOWEL: No evidence of bowel obstruction, colitis,   inflammatory process, or ascites. No pneumoperitoneum.    BONES/SOFT TISSUES: Degenerative changes of the spine are noted.    OTHER: Aortoiliac calcifications are noted with no evidence of abdominal   aortic aneurysm. Circumaortic left renal vein.      IMPRESSION:    No evidence of abdominal or pelvic mass or inflammatory process.    --- End of Report ---            ANJEL PHILIP MD; Attending Interventional Radiologist  This document has been electronically signed. Oct 16 2023  8:36PM (10-16-23 @ 20:03)       24-May-2023 10:53

## 2023-10-18 LAB
HCT VFR BLD CALC: 25.7 % — LOW (ref 37–47)
HCT VFR BLD CALC: 25.7 % — LOW (ref 37–47)
HGB BLD-MCNC: 8.2 G/DL — LOW (ref 12–16)
HGB BLD-MCNC: 8.2 G/DL — LOW (ref 12–16)
MCHC RBC-ENTMCNC: 27.2 PG — SIGNIFICANT CHANGE UP (ref 27–31)
MCHC RBC-ENTMCNC: 27.2 PG — SIGNIFICANT CHANGE UP (ref 27–31)
MCHC RBC-ENTMCNC: 31.9 G/DL — LOW (ref 32–37)
MCHC RBC-ENTMCNC: 31.9 G/DL — LOW (ref 32–37)
MCV RBC AUTO: 85.4 FL — SIGNIFICANT CHANGE UP (ref 81–99)
MCV RBC AUTO: 85.4 FL — SIGNIFICANT CHANGE UP (ref 81–99)
NRBC # BLD: 0 /100 WBCS — SIGNIFICANT CHANGE UP (ref 0–0)
NRBC # BLD: 0 /100 WBCS — SIGNIFICANT CHANGE UP (ref 0–0)
PLATELET # BLD AUTO: 192 K/UL — SIGNIFICANT CHANGE UP (ref 130–400)
PLATELET # BLD AUTO: 192 K/UL — SIGNIFICANT CHANGE UP (ref 130–400)
PMV BLD: 11.5 FL — HIGH (ref 7.4–10.4)
PMV BLD: 11.5 FL — HIGH (ref 7.4–10.4)
RBC # BLD: 3.01 M/UL — LOW (ref 4.2–5.4)
RBC # BLD: 3.01 M/UL — LOW (ref 4.2–5.4)
RBC # FLD: 13.8 % — SIGNIFICANT CHANGE UP (ref 11.5–14.5)
RBC # FLD: 13.8 % — SIGNIFICANT CHANGE UP (ref 11.5–14.5)
WBC # BLD: 8.81 K/UL — SIGNIFICANT CHANGE UP (ref 4.8–10.8)
WBC # BLD: 8.81 K/UL — SIGNIFICANT CHANGE UP (ref 4.8–10.8)
WBC # FLD AUTO: 8.81 K/UL — SIGNIFICANT CHANGE UP (ref 4.8–10.8)
WBC # FLD AUTO: 8.81 K/UL — SIGNIFICANT CHANGE UP (ref 4.8–10.8)

## 2023-10-18 PROCEDURE — 43239 EGD BIOPSY SINGLE/MULTIPLE: CPT

## 2023-10-18 PROCEDURE — 88305 TISSUE EXAM BY PATHOLOGIST: CPT | Mod: 26

## 2023-10-18 PROCEDURE — 88312 SPECIAL STAINS GROUP 1: CPT | Mod: 26

## 2023-10-18 RX ORDER — SODIUM CHLORIDE 9 MG/ML
1000 INJECTION, SOLUTION INTRAVENOUS
Refills: 0 | Status: DISCONTINUED | OUTPATIENT
Start: 2023-10-18 | End: 2023-10-18

## 2023-10-18 RX ORDER — ONDANSETRON 8 MG/1
4 TABLET, FILM COATED ORAL EVERY 8 HOURS
Refills: 0 | Status: DISCONTINUED | OUTPATIENT
Start: 2023-10-18 | End: 2023-10-19

## 2023-10-18 RX ORDER — PANTOPRAZOLE SODIUM 20 MG/1
40 TABLET, DELAYED RELEASE ORAL
Refills: 0 | Status: DISCONTINUED | OUTPATIENT
Start: 2023-10-18 | End: 2023-10-19

## 2023-10-18 RX ORDER — CHLORHEXIDINE GLUCONATE 213 G/1000ML
1 SOLUTION TOPICAL
Refills: 0 | Status: DISCONTINUED | OUTPATIENT
Start: 2023-10-18 | End: 2023-10-19

## 2023-10-18 RX ORDER — LANOLIN ALCOHOL/MO/W.PET/CERES
3 CREAM (GRAM) TOPICAL AT BEDTIME
Refills: 0 | Status: DISCONTINUED | OUTPATIENT
Start: 2023-10-18 | End: 2023-10-19

## 2023-10-18 RX ORDER — ACETAMINOPHEN 500 MG
650 TABLET ORAL EVERY 6 HOURS
Refills: 0 | Status: DISCONTINUED | OUTPATIENT
Start: 2023-10-18 | End: 2023-10-19

## 2023-10-18 RX ORDER — SODIUM CHLORIDE 9 MG/ML
1000 INJECTION, SOLUTION INTRAVENOUS
Refills: 0 | Status: DISCONTINUED | OUTPATIENT
Start: 2023-10-18 | End: 2023-10-19

## 2023-10-18 RX ADMIN — CHLORHEXIDINE GLUCONATE 1 APPLICATION(S): 213 SOLUTION TOPICAL at 05:09

## 2023-10-18 NOTE — PRE-ANESTHESIA EVALUATION ADULT - NSANTHRISKNONERD_GEN_ALL_CORE
Medication changes:  Stop Fluoxetine due to manic symptoms  Taper off Olanzapine 5 mg 1/2 tab orally at bedtime for 1 week, then stop  Replace with Lithium as main mood stabilizer medication and take as prescribed   Check blood levels in 2 weeks prior to next MD appointment   Current Outpatient Medications   Medication Sig   â¢ lithium (LITHOBID) 300 MG CR tablet Take 1 tab orally at bedtime for 1 week, then 2 tabs orally at bedtime   â¢ LORazepam (ATIVAN) 0.5 MG tablet Take 1 tablet by mouth 2 times daily as needed for Anxiety. â¢ Dupixent 300 MG/2ML Solution Pen-injector    â¢ desonide (DESOWEN) 0.05 % cream ROSY TO ECZEMA ON THE FACE UNTIL SMOOTH   â¢ Lo Loestrin Fe 1 MG-10 MCG / 10 MCG tablet      Follow up in 2 weeks   Individual Therapy regularly   Self help books: WHEN PANIC ATTACKS BY SULAIMAN WINSLOW MD  MIND OVER MOOD WORK BOOK  EAT MOVE SLEEP BY HALLEY [employer]     Mindful meditation including use of Apps such as Headspace or CALM. Regular exercise (cardio and or weights as permitted by physical health limitations and as cleared by Primary Medical Doctor) / Juanjose President with primary care physician and other specialists as appropriate and as recommended by those physicians. SAFETY:  Patient was advised to call 911 or to go to the nearest Emergency Department for acute or emergency situations. No risk alerts present

## 2023-10-18 NOTE — CHART NOTE - NSCHARTNOTEFT_GEN_A_CORE
Patient seen and examined throughout the course of the day.  Feels well - no further bleeding. Wants to go home.   Pt had EGD earlier today      Results of Labs/Imaging discussed as well as patient's plan.    Per chart - erosive gastritis found - rec PPI daily x 2 weeks and OP GI fup  CBC in AM    All patient's/family questions answered.  Patient and family encouraged to contact PA with any further issues. Patient seen and examined throughout the course of the day.  Feels well - no further bleeding. Wants to go home.   Pt had EGD earlier today      Results of Labs/Imaging discussed as well as patient's plan.    Per chart - erosive gastritis found - rec PPI daily x 2 weeks and OP GI fup  CBC in AM  CLD tonight    All patient's/family questions answered.  Patient and family encouraged to contact PA with any further issues.  All d/e Dr Viramontes

## 2023-10-18 NOTE — PROGRESS NOTE ADULT - ASSESSMENT
78-year-old female admitted for GI Bleed     #acute blood loss anemia - likely due to recent dental work - however - unclear story if hematemesis or not (spoke with pts sister as well)    - daily NSAID use, coffee use, no anticoag or antiplt  - spoke with GI - plan to do EGD tomorrow - CLD and npo after mn  - hgb mild drop - monitor cbc - only transfuse <7      #depression - recent death of , has not had proper medical follow up - will need outpt follow    GI PPX: ppi  ADVANCE CARE: CPR   DISPOSITION: ACUTE

## 2023-10-18 NOTE — CHART NOTE - NSCHARTNOTEFT_GEN_A_CORE
Findings:  Normal mucosa was noted in the whole esophagus.  Erosions of the mucosa was noted in the stomach. These findings are compatible with erosive gastritis. Multiple cold forceps biopsies were performed for histology.  Normal mucosa was noted in the whole examined duodenum. Multiple cold forceps biopsies were performed for histology.    PLAN    Advance diet as tolerated Avoid NSAIDs  PPI QD for two weeks.   Await pathology results   Follow up visit in 4 weeks after discharge.   colonoscopy as outpatient   - Follow up with our [facility] located at [address] [place] [address]. Phone Number: [phone]

## 2023-10-18 NOTE — PRE-OP CHECKLIST - LAST TOOK
Primary Nurse Boris Garcia and Coty Cleary, RN performed a dual skin assessment on this patient buttocks reddened but blanchable,  2 red think marks on L buttock  (at top and bottom neither are open, old healed wound on lateral L hip  Tre score is 15 water/clears

## 2023-10-19 VITALS
SYSTOLIC BLOOD PRESSURE: 170 MMHG | HEART RATE: 96 BPM | TEMPERATURE: 96 F | RESPIRATION RATE: 18 BRPM | DIASTOLIC BLOOD PRESSURE: 65 MMHG

## 2023-10-19 LAB
ANION GAP SERPL CALC-SCNC: 11 MMOL/L — SIGNIFICANT CHANGE UP (ref 7–14)
ANION GAP SERPL CALC-SCNC: 11 MMOL/L — SIGNIFICANT CHANGE UP (ref 7–14)
BUN SERPL-MCNC: 8 MG/DL — LOW (ref 10–20)
BUN SERPL-MCNC: 8 MG/DL — LOW (ref 10–20)
CALCIUM SERPL-MCNC: 8.4 MG/DL — SIGNIFICANT CHANGE UP (ref 8.4–10.5)
CALCIUM SERPL-MCNC: 8.4 MG/DL — SIGNIFICANT CHANGE UP (ref 8.4–10.5)
CHLORIDE SERPL-SCNC: 107 MMOL/L — SIGNIFICANT CHANGE UP (ref 98–110)
CHLORIDE SERPL-SCNC: 107 MMOL/L — SIGNIFICANT CHANGE UP (ref 98–110)
CO2 SERPL-SCNC: 28 MMOL/L — SIGNIFICANT CHANGE UP (ref 17–32)
CO2 SERPL-SCNC: 28 MMOL/L — SIGNIFICANT CHANGE UP (ref 17–32)
CREAT SERPL-MCNC: 0.7 MG/DL — SIGNIFICANT CHANGE UP (ref 0.7–1.5)
CREAT SERPL-MCNC: 0.7 MG/DL — SIGNIFICANT CHANGE UP (ref 0.7–1.5)
EGFR: 89 ML/MIN/1.73M2 — SIGNIFICANT CHANGE UP
EGFR: 89 ML/MIN/1.73M2 — SIGNIFICANT CHANGE UP
GLUCOSE SERPL-MCNC: 89 MG/DL — SIGNIFICANT CHANGE UP (ref 70–99)
GLUCOSE SERPL-MCNC: 89 MG/DL — SIGNIFICANT CHANGE UP (ref 70–99)
HCT VFR BLD CALC: 25.4 % — LOW (ref 37–47)
HCT VFR BLD CALC: 25.4 % — LOW (ref 37–47)
HGB BLD-MCNC: 8.2 G/DL — LOW (ref 12–16)
HGB BLD-MCNC: 8.2 G/DL — LOW (ref 12–16)
MCHC RBC-ENTMCNC: 28.5 PG — SIGNIFICANT CHANGE UP (ref 27–31)
MCHC RBC-ENTMCNC: 28.5 PG — SIGNIFICANT CHANGE UP (ref 27–31)
MCHC RBC-ENTMCNC: 32.3 G/DL — SIGNIFICANT CHANGE UP (ref 32–37)
MCHC RBC-ENTMCNC: 32.3 G/DL — SIGNIFICANT CHANGE UP (ref 32–37)
MCV RBC AUTO: 88.2 FL — SIGNIFICANT CHANGE UP (ref 81–99)
MCV RBC AUTO: 88.2 FL — SIGNIFICANT CHANGE UP (ref 81–99)
NRBC # BLD: 0 /100 WBCS — SIGNIFICANT CHANGE UP (ref 0–0)
NRBC # BLD: 0 /100 WBCS — SIGNIFICANT CHANGE UP (ref 0–0)
PLATELET # BLD AUTO: 210 K/UL — SIGNIFICANT CHANGE UP (ref 130–400)
PLATELET # BLD AUTO: 210 K/UL — SIGNIFICANT CHANGE UP (ref 130–400)
PMV BLD: 12.1 FL — HIGH (ref 7.4–10.4)
PMV BLD: 12.1 FL — HIGH (ref 7.4–10.4)
POTASSIUM SERPL-MCNC: 3.7 MMOL/L — SIGNIFICANT CHANGE UP (ref 3.5–5)
POTASSIUM SERPL-MCNC: 3.7 MMOL/L — SIGNIFICANT CHANGE UP (ref 3.5–5)
POTASSIUM SERPL-SCNC: 3.7 MMOL/L — SIGNIFICANT CHANGE UP (ref 3.5–5)
POTASSIUM SERPL-SCNC: 3.7 MMOL/L — SIGNIFICANT CHANGE UP (ref 3.5–5)
RBC # BLD: 2.88 M/UL — LOW (ref 4.2–5.4)
RBC # BLD: 2.88 M/UL — LOW (ref 4.2–5.4)
RBC # FLD: 13.6 % — SIGNIFICANT CHANGE UP (ref 11.5–14.5)
RBC # FLD: 13.6 % — SIGNIFICANT CHANGE UP (ref 11.5–14.5)
SODIUM SERPL-SCNC: 146 MMOL/L — SIGNIFICANT CHANGE UP (ref 135–146)
SODIUM SERPL-SCNC: 146 MMOL/L — SIGNIFICANT CHANGE UP (ref 135–146)
WBC # BLD: 7.8 K/UL — SIGNIFICANT CHANGE UP (ref 4.8–10.8)
WBC # BLD: 7.8 K/UL — SIGNIFICANT CHANGE UP (ref 4.8–10.8)
WBC # FLD AUTO: 7.8 K/UL — SIGNIFICANT CHANGE UP (ref 4.8–10.8)
WBC # FLD AUTO: 7.8 K/UL — SIGNIFICANT CHANGE UP (ref 4.8–10.8)

## 2023-10-19 RX ORDER — PANTOPRAZOLE SODIUM 20 MG/1
1 TABLET, DELAYED RELEASE ORAL
Qty: 30 | Refills: 5
Start: 2023-10-19 | End: 2024-04-15

## 2023-10-19 RX ADMIN — PANTOPRAZOLE SODIUM 40 MILLIGRAM(S): 20 TABLET, DELAYED RELEASE ORAL at 05:29

## 2023-10-19 RX ADMIN — CHLORHEXIDINE GLUCONATE 1 APPLICATION(S): 213 SOLUTION TOPICAL at 05:29

## 2023-10-19 NOTE — DISCHARGE NOTE PROVIDER - POSTFACE STATEMENT FOR MINUTES SPENT
58y F with PMHx of HTN, Anxiety presents to the ED with c/o SOB x4 weeks. Went to Urgent Care yesterday, had EKG done, was told it was abnormal, however went to see her PCP today, who said EKG was normal. PCP sent pt into the ED for possible CTA as pt had recently traveled from [place]. States that she also had the SOB in [place] when she was walking between the kitchen and the living room there. Denies leg pain and leg swelling. Denies prior hx of blood clots. Denies smoking. Denies chest pain.     IChristel (Scribe) have documented this rapid assessment note under the dictation of Connie Villatoro (PA) which has been reviewed and affirmed to be accurate. Patient was seen as a QPA patient. The patient will be seen and further worked up in the main emergency department and their care will be completed by the main emergency department team along with a thorough physical exam. Receiving team will follow up on labs, analgesia, any clinical imaging, reassess and disposition as clinically indicated, all decisions regarding the progression of care will be made at their discretion. minutes on the discharge service. 58y F with PMHx of HTN, Anxiety presents to the ED with c/o SOB x4 weeks. Went to Urgent Care yesterday, had EKG done, was told it was abnormal, however went to see her PCP today, who said EKG was normal. PCP sent pt into the ED for possible CTA as pt had recently traveled from [place]. States that she also had the SOB in [place] when she was walking between the kitchen and the living room there. Denies leg pain and leg swelling. Denies prior hx of blood clots. Denies smoking. Denies chest pain.     IChristel (Zelda) have documented this rapid assessment note under the dictation of Connie Villatoro (PA) which has been reviewed and affirmed to be accurate. Patient was seen as a QPA patient. The patient will be seen and further worked up in the main emergency department and their care will be completed by the main emergency department team along with a thorough physical exam. Receiving team will follow up on labs, analgesia, any clinical imaging, reassess and disposition as clinically indicated, all decisions regarding the progression of care will be made at their discretion...    **Pt seen, briefly, in the waiting room by Connie Villatoro PA-C for rapid assessment. documentation completed by Zelda Rdz Pt to be sent to main ED for full physical examination and assessment - all orders placed to be followed by MD in the main ED**

## 2023-10-19 NOTE — DISCHARGE NOTE PROVIDER - CARE PROVIDER_API CALL
Joaquin Viramontes.  Internal Medicine  [address] [place] [address], [facility] [address]  [place] [address]  Phone: [phone]  Fax: [phone]  Established Patient  Scheduled Appointment: 10/24/2023

## 2023-10-19 NOTE — CHART NOTE - NSCHARTNOTEFT_GEN_A_CORE
PA notified to discharge patient as per attending.  Case discussed with Dr. Viramontes.  Patient stable, no changes.  Pt tolerated regular diet this morning.  Patient agreeable to discharge and sister Lorraine notified.         T(C): 36.1 (10-19-23 @ 04:48), Max: 37 (10-18-23 @ 13:12)  HR: 86 (10-19-23 @ 04:48) (85 - 105)  BP: 145/64 (10-19-23 @ 04:48) (105/55 - 184/71)  RR: 18 (10-19-23 @ 04:48) (17 - 22)  SpO2: 98% (10-19-23 @ 04:48) (98% - 100%)        Patient discharged home.   RN aware and will manage.

## 2023-10-19 NOTE — PROGRESS NOTE ADULT - SUBJECTIVE AND OBJECTIVE BOX
78-year-old female admitted for hematechezia; .    Interval: s/p EGD    PAST MEDICAL & SURGICAL HISTORY:    No pertinent past medical history  No significant past surgical history    Social History:       MEDICATIONS  (STANDING):  chlorhexidine 2% Cloths 1 Application(s) Topical <User Schedule>  lactated ringers. 1000 milliLiter(s) (75 mL/Hr) IV Continuous <Continuous>  pantoprazole    Tablet 40 milliGRAM(s) Oral before breakfast    MEDICATIONS  (PRN):  acetaminophen     Tablet .. 650 milliGRAM(s) Oral every 6 hours PRN Temp greater or equal to 38C (100.4F), Mild Pain (1 - 3)  aluminum hydroxide/magnesium hydroxide/simethicone Suspension 30 milliLiter(s) Oral every 4 hours PRN Dyspepsia  melatonin 3 milliGRAM(s) Oral at bedtime PRN Insomnia  ondansetron Injectable 4 milliGRAM(s) IV Push every 8 hours PRN Nausea and/or Vomiting    Allergies    No Known Allergies    Intolerances    Vital Signs Last 24 Hrs  T(C): 36.1 (19 Oct 2023 04:48), Max: 37 (18 Oct 2023 13:12)  T(F): 96.9 (19 Oct 2023 04:48), Max: 98.6 (18 Oct 2023 13:12)  HR: 86 (19 Oct 2023 04:48) (85 - 105)  BP: 145/64 (19 Oct 2023 04:48) (105/55 - 184/71)  BP(mean): --  RR: 18 (19 Oct 2023 04:48) (17 - 22)  SpO2: 98% (19 Oct 2023 04:48) (98% - 100%)    Parameters below as of 19 Oct 2023 04:48  Patient On (Oxygen Delivery Method): room air    Diet, Clear Liquid (10-18-23 @ 17:00) [Active]    PHYSICAL EXAM:    Gen: NAD, resting in bed  HEENT: Normocephalic, atraumatic  Neck: supple, no lymphadenopathy  CV: Regular rate & regular rhythm  Diet, Clear Liquid (10-18-23 @ 17:00) [Active]      Lungs: CTABL no wheeze  Abdomen: Soft, NTND+ BS present  Ext: Warm, well perfused no CCE  Neuro: non focal, awake, CN II-XII intact   Skin: no rash, no erythema  Psych: no SI, HI, Hallucination     LABS                          8.2    8.81  )-----------( 192      ( 18 Oct 2023 07:00 )             25.7                           8.4    10.76 )-----------( 271      ( 17 Oct 2023 11:30 )             26.2     10-17    143  |  106  |  27<H>  ----------------------------<  96  4.1   |  28  |  1.0    Ca    9.0      17 Oct 2023 09:45    TPro  5.7<L>  /  Alb  3.5  /  TBili  0.5  /  DBili  x   /  AST  21  /  ALT  6   /  AlkPhos  52  10-17      10-17    143  |  106  |  27<H>  ----------------------------<  96  4.1   |  28  |  1.0    Ca    9.0      17 Oct 2023 09:45    TPro  5.7<L>  /  Alb  3.5  /  TBili  0.5  /  DBili  x   /  AST  21  /  ALT  6   /  AlkPhos  52  10-17    PT/INR - ( 16 Oct 2023 15:45 )   PT: 12.00 sec;   INR: 1.05 ratio         PTT - ( 16 Oct 2023 15:45 )  PTT:25.3 sec    PROCEDURE:    Findings:  Normal mucosa was noted in the whole esophagus.  Erosions of the mucosa was noted in the stomach. These findings are compatible with erosive gastritis. Multiple cold forceps biopsies were performed for histology.  Normal mucosa was noted in the whole examined duodenum. Multiple cold forceps biopsies were performed for histology    RADIOLOGY:    ACC: [id number] EXAM:  CT ABDOMEN AND PELVIS IC   ORDERED BY: JOLANTA SHETH     PROCEDURE DATE:  10/16/2023          INTERPRETATION:  REASON FOR EXAM / CLINICAL STATEMENT:  Hematemesis. WBC   16.14  Hgb 9.3    PMHx of  diabetes, possible dementia      TECHNIQUE:  Contiguous axial CT images were obtained from the diaphragms   through the pubic symphysis with IV contrast. IV contrast was employed   (Omnipaque 350); 90 ml was administered and 10 ml was discarded.    Reformatted images in the coronal and sagittal planes were acquired.      COMPARISON CT: None    OTHER STUDIES USED FOR CORRELATION: None.      TUBES AND LINES: None.    LOWER CHEST: The lung bases are clear. No pleural or pericardial effusion.    HEPATIC: The liver is normal in size with no evidence of solid mass or   bile duct dilatation. The portal vein is patent. The hepatic veins are   opacified.    BILIARY: No calcified gallstones are noted.    SPLEEN: Unremarkable.    PANCREAS: The pancreas is normal in size and configuration. No evidence   of mass or pancreatitis.    ADRENAL GLANDS: Unremarkable.    KIDNEYS: There is symmetric renal enhancement. No evidence of   hydronephrosis or solid mass. Right renal cyst and bilateral   subcentimeter hypodense cortical lesionstoo small to characterize.    ABDOMINOPELVIC NODES: Unremarkable.    PELVIC ORGANS: Calcified uterine fibroid. No evidence of pelvic   lymphadenopathy or fluid collection.    BLADDER: Unremarkable.    PERITONEUM/MESENTERY/BOWEL: No evidence of bowel obstruction, colitis,   inflammatory process, or ascites. No pneumoperitoneum.    BONES/SOFT TISSUES: Degenerative changes of the spine are noted.    OTHER: Aortoiliac calcifications are noted with no evidence of abdominal   aortic aneurysm. Circumaortic left renal vein.      IMPRESSION:    No evidence of abdominal or pelvic mass or inflammatory process.    --- End of Report ---            ANJEL PHILIP MD; Attending Interventional Radiologist  This document has been electronically signed. Oct 16 2023  8:36PM        
    LORRIE GUZMAN  78y, Female  Allergy: No Known Allergies      CHIEF COMPLAINT: r/o GI bleed (17 Oct 2023 10:32)      HPI:  78-year-old female accompanied by her best friend at bed side c/o vomiting blood. Pt states that her friend didn't understand that she went to the dentist and she had an oral blister that bled and it's not GI bleed.  Pt  with past medical history of diabetes, mild dementia.  Presenting with hematemesis.  Patient poor historian, states that she had tooth bridge implanted 10 days ago with bleeding afterwards which stopped 4 days ago.  Pt denies hematemesis.  Patient's best friend at bedside, endorsed witnessing hematemesis yesterday, multiple episodes.  Denies abdominal pain.  Denies fever, chills.  Denies lightheadedness NO  bright red blood per rectum or melena.  No history of alcohol use.   (16 Oct 2023 20:33)    HPI:    FAMILY HISTORY:  No pertinent family history in first degree relatives      PAST MEDICAL & SURGICAL HISTORY:  No pertinent past medical history      No significant past surgical history          SOCIAL HISTORY  Social History:      Home Medications:      ROS  General: Denies fevers, chills, nightsweats, weight loss  HEENT: Denies headache, rhinorrhea, sore throat, eye pain  CV: Denies CP, palpitations  PULM: Denies SOB, cough  GI: Denies abdominal pain, diarrhea  : Denies dysuria, hematuria  MSK: Denies arthralgias  SKIN: Denies rash   NEURO: Denies paresthesias, weakness  PSYCH: Denies depression    VITALS:  T(F): 98.7, Max: 98.7 (10-17-23 @ 11:52)  HR: 103  BP: 151/66  RR: 18Vital Signs Last 24 Hrs  T(C): 37.1 (17 Oct 2023 11:52), Max: 37.1 (17 Oct 2023 11:52)  T(F): 98.7 (17 Oct 2023 11:52), Max: 98.7 (17 Oct 2023 11:52)  HR: 103 (17 Oct 2023 11:52) (91 - 135)  BP: 151/66 (17 Oct 2023 11:52) (127/68 - 151/66)  BP(mean): --  RR: 18 (17 Oct 2023 11:52) (18 - 20)  SpO2: 98% (17 Oct 2023 11:52) (98% - 100%)    Parameters below as of 17 Oct 2023 11:52  Patient On (Oxygen Delivery Method): room air        PHYSICAL EXAM:  Gen: NAD, resting in bed  HEENT: Normocephalic, atraumatic  Neck: supple, no lymphadenopathy  CV: Regular rate & regular rhythm  Lungs: CTABL no wheeze  Abdomen: Soft, NTND+ BS present  Ext: Warm, well perfused no CCE  Neuro: non focal, awake, CN II-XII intact   Skin: no rash, no erythema  Psych: no SI, HI, Hallucination     TESTS & MEASUREMENTS:                        8.4    10.76 )-----------( 271      ( 17 Oct 2023 11:30 )             26.2     10-17    143  |  106  |  27<H>  ----------------------------<  96  4.1   |  28  |  1.0    Ca    9.0      17 Oct 2023 09:45    TPro  5.7<L>  /  Alb  3.5  /  TBili  0.5  /  DBili  x   /  AST  21  /  ALT  6   /  AlkPhos  52  10-17      LIVER FUNCTIONS - ( 17 Oct 2023 09:45 )  Alb: 3.5 g/dL / Pro: 5.7 g/dL / ALK PHOS: 52 U/L / ALT: 6 U/L / AST: 21 U/L / GGT: x           Urinalysis Basic - ( 17 Oct 2023 09:45 )    Color: x / Appearance: x / SG: x / pH: x  Gluc: 96 mg/dL / Ketone: x  / Bili: x / Urobili: x   Blood: x / Protein: x / Nitrite: x   Leuk Esterase: x / RBC: x / WBC x   Sq Epi: x / Non Sq Epi: x / Bacteria: x          Lactate, Blood: 2.5 mmol/L (10-16-23 @ 15:45)    QRS axis to [] ° and NSR at a rate of [] BPM. There was no atrial enlargement. There was no ventricular hypertrophy. There were no ST-T changes and all intervals were normal.      INFECTIOUS DISEASES TESTING      RADIOLOGY & ADDITIONAL TESTS:  I have personally reviewed the last Chest xray  CXR      CT  CT Abdomen and Pelvis w/ IV Cont:   ACC: [id number] EXAM:  CT ABDOMEN AND PELVIS IC   ORDERED BY: JOLANTA   MERYL     PROCEDURE DATE:  10/16/2023          INTERPRETATION:  REASON FOR EXAM / CLINICAL STATEMENT:  Hematemesis. WBC   16.14  Hgb 9.3    PMHx of  diabetes, possible dementia      TECHNIQUE:  Contiguous axial CT images were obtained from the diaphragms   through the pubic symphysis with IV contrast. IV contrast was employed   (Omnipaque 350); 90 ml was administered and 10 ml was discarded.    Reformatted images in the coronal and sagittal planes were acquired.      COMPARISON CT: None    OTHER STUDIES USED FOR CORRELATION: None.      TUBES AND LINES: None.    LOWER CHEST: The lung bases are clear. No pleural or pericardial effusion.    HEPATIC: The liver is normal in size with no evidence of solid mass or   bile duct dilatation. The portal vein is patent. The hepatic veins are   opacified.    BILIARY: No calcified gallstones are noted.    SPLEEN: Unremarkable.    PANCREAS: The pancreas is normal in size and configuration. No evidence   of mass or pancreatitis.    ADRENAL GLANDS: Unremarkable.    KIDNEYS: There is symmetric renal enhancement. No evidence of   hydronephrosis or solid mass. Right renal cyst and bilateral   subcentimeter hypodense cortical lesionstoo small to characterize.    ABDOMINOPELVIC NODES: Unremarkable.    PELVIC ORGANS: Calcified uterine fibroid. No evidence of pelvic   lymphadenopathy or fluid collection.    BLADDER: Unremarkable.    PERITONEUM/MESENTERY/BOWEL: No evidence of bowel obstruction, colitis,   inflammatory process, or ascites. No pneumoperitoneum.    BONES/SOFT TISSUES: Degenerative changes of the spine are noted.    OTHER: Aortoiliac calcifications are noted with no evidence of abdominal   aortic aneurysm. Circumaortic left renal vein.      IMPRESSION:    No evidence of abdominal or pelvic mass or inflammatory process.    --- End of Report ---            ANJEL PHILIP MD; Attending Interventional Radiologist  This document has been electronically signed. Oct 16 2023  8:36PM (10-16-23 @ 20:03)      CARDIOLOGY TESTING  12 Lead ECG:   Ventricular Rate 115 BPM    Atrial Rate 115 BPM    P-R Interval 132 ms    QRS Duration 110 ms    Q-T Interval 350 ms    QTC Calculation(Bazett) 484 ms    P Axis 13 degrees    R Axis -20 degrees    T Axis 75 degrees    Diagnosis Line Sinus tachycardia  Minimal voltage criteria for LVH, may be normal variant  Nonspecific ST and T wave abnormality  Abnormal ECG    Confirmed by KELLIE CONTRERAS, ANJEL ([id number]) on 10/16/2023 4:04:56 PM (10-16-23 @ 15:47)      MEDICATIONS  (STANDING):  cefTRIAXone   IVPB 1000 milliGRAM(s) IV Intermittent every 24 hours  chlorhexidine 2% Cloths 1 Application(s) Topical <User Schedule>  doxycycline IVPB 100 milliGRAM(s) IV Intermittent every 12 hours  pantoprazole    Tablet 40 milliGRAM(s) Oral before breakfast    MEDICATIONS  (PRN):  acetaminophen     Tablet .. 650 milliGRAM(s) Oral every 6 hours PRN Temp greater or equal to 38C (100.4F), Mild Pain (1 - 3)  aluminum hydroxide/magnesium hydroxide/simethicone Suspension 30 milliLiter(s) Oral every 4 hours PRN Dyspepsia  melatonin 3 milliGRAM(s) Oral at bedtime PRN Insomnia  ondansetron Injectable 4 milliGRAM(s) IV Push every 8 hours PRN Nausea and/or Vomiting      ANTIBIOTICS:  cefTRIAXone   IVPB 1000 milliGRAM(s) IV Intermittent every 24 hours  doxycycline IVPB 100 milliGRAM(s) IV Intermittent every 12 hours      All available historical data has been reviewed    ASSESSMENT  78y F admitted with Hematemesis        PROBLEMS  
78-year-old female accompanied by her best friend at bed side c/o vomiting blood. Pt states that her friend didn't understand that she went to the dentist and she had an oral blister that bled and it's not GI bleed.  Pt  with past medical history of diabetes, mild dementia.  Presenting with hematemesis.  Patient poor historian, states that she had tooth bridge implanted 10 days ago with bleeding afterwards which stopped 4 days ago.  Pt denies hematemesis.  Patient's best friend at bedside, endorsed witnessing hematemesis yesterday, multiple episodes.  Denies abdominal pain.  Denies fever, chills.  Denies lightheadedness NO  bright red blood per rectum or melena.  No history of alcohol use.    Interval: seen by GI for ENDO    PAST MEDICAL & SURGICAL HISTORY:    No pertinent past medical history  No significant past surgical history    Social History:       MEDICATIONS  (STANDING):  chlorhexidine 2% Cloths 1 Application(s) Topical <User Schedule>  pantoprazole    Tablet 40 milliGRAM(s) Oral before breakfast    MEDICATIONS  (PRN):  acetaminophen     Tablet .. 650 milliGRAM(s) Oral every 6 hours PRN Temp greater or equal to 38C (100.4F), Mild Pain (1 - 3)  aluminum hydroxide/magnesium hydroxide/simethicone Suspension 30 milliLiter(s) Oral every 4 hours PRN Dyspepsia  melatonin 3 milliGRAM(s) Oral at bedtime PRN Insomnia  ondansetron Injectable 4 milliGRAM(s) IV Push every 8 hours PRN Nausea and/or Vomiting    Allergies    No Known Allergies    Intolerances    Vital Signs Last 24 Hrs  T(C): 35.8 (18 Oct 2023 05:00), Max: 37.1 (17 Oct 2023 11:52)  T(F): 96.5 (18 Oct 2023 05:00), Max: 98.7 (17 Oct 2023 11:52)  HR: 105 (18 Oct 2023 05:00) (97 - 105)  BP: 144/67 (18 Oct 2023 05:00) (144/67 - 154/67)  BP(mean): --  RR: 18 (18 Oct 2023 05:00) (18 - 18)  SpO2: 99% (18 Oct 2023 05:00) (98% - 99%)    Parameters below as of 18 Oct 2023 05:00  Patient On (Oxygen Delivery Method): room air    Diet, NPO after Midnight:      NPO Start Date: 17-Oct-2023,   NPO Start Time: 23:59 (10-17-23 @ 13:22) [Active]  Diet, Clear Liquid:   No Red Dye (10-17-23 @ 11:34) [Active]      PHYSICAL EXAM:    Gen: NAD, resting in bed  HEENT: Normocephalic, atraumatic  Neck: supple, no lymphadenopathy  CV: Regular rate & regular rhythm  Lungs: CTABL no wheeze  Abdomen: Soft, NTND+ BS present  Ext: Warm, well perfused no CCE  Neuro: non focal, awake, CN II-XII intact   Skin: no rash, no erythema  Psych: no SI, HI, Hallucination     LABS                          8.4    10.76 )-----------( 271      ( 17 Oct 2023 11:30 )             26.2   10-17    143  |  106  |  27<H>  ----------------------------<  96  4.1   |  28  |  1.0    Ca    9.0      17 Oct 2023 09:45    TPro  5.7<L>  /  Alb  3.5  /  TBili  0.5  /  DBili  x   /  AST  21  /  ALT  6   /  AlkPhos  52  10-17    PT/INR - ( 16 Oct 2023 15:45 )   PT: 12.00 sec;   INR: 1.05 ratio         PTT - ( 16 Oct 2023 15:45 )  PTT:25.3 sec    RADIOLOGY:    ACC: [id number] EXAM:  CT ABDOMEN AND PELVIS IC   ORDERED BY: JOLANTA SHETH     PROCEDURE DATE:  10/16/2023          INTERPRETATION:  REASON FOR EXAM / CLINICAL STATEMENT:  Hematemesis. WBC   16.14  Hgb 9.3    PMHx of  diabetes, possible dementia      TECHNIQUE:  Contiguous axial CT images were obtained from the diaphragms   through the pubic symphysis with IV contrast. IV contrast was employed   (Omnipaque 350); 90 ml was administered and 10 ml was discarded.    Reformatted images in the coronal and sagittal planes were acquired.      COMPARISON CT: None    OTHER STUDIES USED FOR CORRELATION: None.      TUBES AND LINES: None.    LOWER CHEST: The lung bases are clear. No pleural or pericardial effusion.    HEPATIC: The liver is normal in size with no evidence of solid mass or   bile duct dilatation. The portal vein is patent. The hepatic veins are   opacified.    BILIARY: No calcified gallstones are noted.    SPLEEN: Unremarkable.    PANCREAS: The pancreas is normal in size and configuration. No evidence   of mass or pancreatitis.    ADRENAL GLANDS: Unremarkable.    KIDNEYS: There is symmetric renal enhancement. No evidence of   hydronephrosis or solid mass. Right renal cyst and bilateral   subcentimeter hypodense cortical lesionstoo small to characterize.    ABDOMINOPELVIC NODES: Unremarkable.    PELVIC ORGANS: Calcified uterine fibroid. No evidence of pelvic   lymphadenopathy or fluid collection.    BLADDER: Unremarkable.    PERITONEUM/MESENTERY/BOWEL: No evidence of bowel obstruction, colitis,   inflammatory process, or ascites. No pneumoperitoneum.    BONES/SOFT TISSUES: Degenerative changes of the spine are noted.    OTHER: Aortoiliac calcifications are noted with no evidence of abdominal   aortic aneurysm. Circumaortic left renal vein.      IMPRESSION:    No evidence of abdominal or pelvic mass or inflammatory process.    --- End of Report ---            ANJEL PHILIP MD; Attending Interventional Radiologist  This document has been electronically signed. Oct 16 2023  8:36PM

## 2023-10-19 NOTE — DISCHARGE NOTE NURSING/CASE MANAGEMENT/SOCIAL WORK - PATIENT PORTAL LINK FT
You can access the FollowMyHealth Patient Portal offered by [facility] by registering at the following website: [email or web address]. By joining [facility]’s FollowMyHealth portal, you will also be able to view your health information using other applications (apps) compatible with our system.

## 2023-10-19 NOTE — DISCHARGE NOTE PROVIDER - NSDCCPCAREPLAN_GEN_ALL_CORE_FT
no
PRINCIPAL DISCHARGE DIAGNOSIS  Diagnosis: Hematemesis  Assessment and Plan of Treatment:       SECONDARY DISCHARGE DIAGNOSES  Diagnosis: PNA (pneumonia)  Assessment and Plan of Treatment:

## 2023-10-19 NOTE — PROGRESS NOTE ADULT - ASSESSMENT
78-year-old female admitted for GI Bleed     #acute blood loss anemia -Erosive Gastritis    - PPI daily   - Advance Diet   - avoid nsaid  - outpatient follow up     #depression - recent death of , has not had proper medical follow up - will need outpt follow    GI PPX: ppi  ADVANCE CARE: CPR   DISPOSITION: for D/C today advance diet; follow up in office 1 week , patient aware and in agreement

## 2023-10-19 NOTE — DISCHARGE NOTE PROVIDER - HOSPITAL COURSE
78-year-old female accompanied by her best friend at bed side c/o vomiting blood. Pt states that her friend didn't understand that she went to the dentist and she had an oral blister that bled and it's not GI bleed.  Pt  with past medical history of diabetes, mild dementia.  Presenting with hematemesis.  Patient poor historian, states that she had tooth bridge implanted 10 days ago with bleeding afterwards which stopped 4 days ago.  Pt denies hematemesis.  Patient's best friend at bedside, endorsed witnessing hematemesis yesterday, multiple episodes.  Denies abdominal pain.  Denies fever, chills.  Denies lightheadedness NO  bright red blood per rectum or melena.  No history of alcohol use.    Findings:  Normal mucosa was noted in the whole esophagus.  Erosions of the mucosa was noted in the stomach. These findings are compatible with erosive gastritis. Multiple cold forceps biopsies were performed for histology.  Normal mucosa was noted in the whole examined duodenum. Multiple cold forceps biopsies were performed for histology.    PLAN    Advance diet as tolerated Avoid NSAIDs   PPI QD for two weeks.    Await pathology results    Follow up visit in 4 weeks after discharge.   colonoscopy as outpatient   - Follow up with our [facility] located at [address] [place] [address]. Phone Number: [phone].

## 2023-10-20 LAB
SURGICAL PATHOLOGY STUDY: SIGNIFICANT CHANGE UP
SURGICAL PATHOLOGY STUDY: SIGNIFICANT CHANGE UP

## 2023-10-22 LAB
CULTURE RESULTS: SIGNIFICANT CHANGE UP
SPECIMEN SOURCE: SIGNIFICANT CHANGE UP